# Patient Record
Sex: FEMALE | Race: WHITE | URBAN - METROPOLITAN AREA
[De-identification: names, ages, dates, MRNs, and addresses within clinical notes are randomized per-mention and may not be internally consistent; named-entity substitution may affect disease eponyms.]

---

## 2017-12-21 PROCEDURE — 86762 RUBELLA ANTIBODY: CPT | Performed by: OBSTETRICS & GYNECOLOGY

## 2017-12-21 PROCEDURE — 87491 CHLMYD TRACH DNA AMP PROBE: CPT | Performed by: OBSTETRICS & GYNECOLOGY

## 2017-12-21 PROCEDURE — 86850 RBC ANTIBODY SCREEN: CPT | Performed by: OBSTETRICS & GYNECOLOGY

## 2017-12-21 PROCEDURE — 87591 N.GONORRHOEAE DNA AMP PROB: CPT | Performed by: OBSTETRICS & GYNECOLOGY

## 2017-12-21 PROCEDURE — 87624 HPV HI-RISK TYP POOLED RSLT: CPT | Performed by: OBSTETRICS & GYNECOLOGY

## 2017-12-21 PROCEDURE — 86780 TREPONEMA PALLIDUM: CPT | Performed by: OBSTETRICS & GYNECOLOGY

## 2017-12-21 PROCEDURE — 86901 BLOOD TYPING SEROLOGIC RH(D): CPT | Performed by: OBSTETRICS & GYNECOLOGY

## 2017-12-21 PROCEDURE — 87389 HIV-1 AG W/HIV-1&-2 AB AG IA: CPT | Performed by: OBSTETRICS & GYNECOLOGY

## 2017-12-21 PROCEDURE — 88175 CYTOPATH C/V AUTO FLUID REDO: CPT | Performed by: OBSTETRICS & GYNECOLOGY

## 2017-12-21 PROCEDURE — 87340 HEPATITIS B SURFACE AG IA: CPT | Performed by: OBSTETRICS & GYNECOLOGY

## 2017-12-21 PROCEDURE — 87086 URINE CULTURE/COLONY COUNT: CPT | Performed by: OBSTETRICS & GYNECOLOGY

## 2017-12-21 PROCEDURE — 86900 BLOOD TYPING SEROLOGIC ABO: CPT | Performed by: OBSTETRICS & GYNECOLOGY

## 2018-05-01 PROCEDURE — 86780 TREPONEMA PALLIDUM: CPT | Performed by: OBSTETRICS & GYNECOLOGY

## 2018-05-01 PROCEDURE — 87086 URINE CULTURE/COLONY COUNT: CPT | Performed by: OBSTETRICS & GYNECOLOGY

## 2018-05-01 PROCEDURE — 82950 GLUCOSE TEST: CPT | Performed by: OBSTETRICS & GYNECOLOGY

## 2018-05-30 PROCEDURE — 36415 COLL VENOUS BLD VENIPUNCTURE: CPT | Performed by: OBSTETRICS & GYNECOLOGY

## 2018-05-30 PROCEDURE — 82951 GLUCOSE TOLERANCE TEST (GTT): CPT | Performed by: OBSTETRICS & GYNECOLOGY

## 2018-05-30 PROCEDURE — 82952 GTT-ADDED SAMPLES: CPT | Performed by: OBSTETRICS & GYNECOLOGY

## 2018-06-25 LAB — AMB EXT STREP B CULTURE: NEGATIVE

## 2018-07-30 ENCOUNTER — LAB ENCOUNTER (OUTPATIENT)
Dept: LAB | Facility: HOSPITAL | Age: 38
End: 2018-07-30
Attending: OBSTETRICS & GYNECOLOGY
Payer: COMMERCIAL

## 2018-07-30 DIAGNOSIS — Z01.818 PRE-OP TESTING: ICD-10-CM

## 2018-07-30 LAB
ANTIBODY SCREEN: NEGATIVE
BASOPHILS # BLD: 0 K/UL (ref 0–0.2)
BASOPHILS NFR BLD: 0 %
EOSINOPHIL # BLD: 0.1 K/UL (ref 0–0.7)
EOSINOPHIL NFR BLD: 1 %
ERYTHROCYTE [DISTWIDTH] IN BLOOD BY AUTOMATED COUNT: 13.4 % (ref 11–15)
HCT VFR BLD AUTO: 40.4 % (ref 35–48)
HGB BLD-MCNC: 14.2 G/DL (ref 12–16)
LYMPHOCYTES # BLD: 1.2 K/UL (ref 1–4)
LYMPHOCYTES NFR BLD: 12 %
MCH RBC QN AUTO: 31.2 PG (ref 27–32)
MCHC RBC AUTO-ENTMCNC: 35.3 G/DL (ref 32–37)
MCV RBC AUTO: 88.5 FL (ref 80–100)
MONOCYTES # BLD: 0.7 K/UL (ref 0–1)
MONOCYTES NFR BLD: 7 %
NEUTROPHILS # BLD AUTO: 8.2 K/UL (ref 1.8–7.7)
NEUTROPHILS NFR BLD: 81 %
PLATELET # BLD AUTO: 160 K/UL (ref 140–400)
PMV BLD AUTO: 8.9 FL (ref 7.4–10.3)
RBC # BLD AUTO: 4.56 M/UL (ref 3.7–5.4)
RH BLOOD TYPE: POSITIVE
WBC # BLD AUTO: 10.2 K/UL (ref 4–11)

## 2018-07-30 PROCEDURE — 36415 COLL VENOUS BLD VENIPUNCTURE: CPT

## 2018-07-30 PROCEDURE — 85025 COMPLETE CBC W/AUTO DIFF WBC: CPT

## 2018-07-30 PROCEDURE — 86901 BLOOD TYPING SEROLOGIC RH(D): CPT

## 2018-07-30 PROCEDURE — 86900 BLOOD TYPING SEROLOGIC ABO: CPT

## 2018-07-30 PROCEDURE — 86850 RBC ANTIBODY SCREEN: CPT

## 2018-07-31 ENCOUNTER — ANESTHESIA EVENT (OUTPATIENT)
Dept: OBGYN UNIT | Facility: HOSPITAL | Age: 38
End: 2018-07-31
Payer: COMMERCIAL

## 2018-08-01 ENCOUNTER — ANESTHESIA (OUTPATIENT)
Dept: OBGYN UNIT | Facility: HOSPITAL | Age: 38
End: 2018-08-01
Payer: COMMERCIAL

## 2018-08-01 ENCOUNTER — SURGERY (OUTPATIENT)
Age: 38
End: 2018-08-01

## 2018-08-01 ENCOUNTER — HOSPITAL ENCOUNTER (INPATIENT)
Facility: HOSPITAL | Age: 38
LOS: 3 days | Discharge: HOME | End: 2018-08-04
Attending: OBSTETRICS & GYNECOLOGY | Admitting: OBSTETRICS & GYNECOLOGY
Payer: COMMERCIAL

## 2018-08-01 PROBLEM — O34.219 PREVIOUS CESAREAN DELIVERY AFFECTING PREGNANCY: Status: ACTIVE | Noted: 2018-08-01

## 2018-08-01 PROBLEM — Z98.891 S/P C-SECTION: Status: ACTIVE | Noted: 2018-08-01

## 2018-08-01 LAB
GLUCOSE BLDC GLUCOMTR-MCNC: 104 MG/DL (ref 70–99)
HIV1+2 AB SPEC QL IA.RAPID: NONREACTIVE

## 2018-08-01 PROCEDURE — 88307 TISSUE EXAM BY PATHOLOGIST: CPT | Performed by: OBSTETRICS & GYNECOLOGY

## 2018-08-01 PROCEDURE — 82962 GLUCOSE BLOOD TEST: CPT

## 2018-08-01 PROCEDURE — 86701 HIV-1ANTIBODY: CPT | Performed by: OBSTETRICS & GYNECOLOGY

## 2018-08-01 PROCEDURE — S0028 INJECTION, FAMOTIDINE, 20 MG: HCPCS

## 2018-08-01 PROCEDURE — 36415 COLL VENOUS BLD VENIPUNCTURE: CPT

## 2018-08-01 PROCEDURE — 51702 INSERT TEMP BLADDER CATH: CPT

## 2018-08-01 RX ORDER — SODIUM PHOSPHATE, DIBASIC AND SODIUM PHOSPHATE, MONOBASIC 7; 19 G/133ML; G/133ML
1 ENEMA RECTAL ONCE AS NEEDED
Status: DISCONTINUED | OUTPATIENT
Start: 2018-08-01 | End: 2018-08-04

## 2018-08-01 RX ORDER — HYDROCODONE BITARTRATE AND ACETAMINOPHEN 5; 325 MG/1; MG/1
2 TABLET ORAL EVERY 6 HOURS PRN
Status: DISCONTINUED | OUTPATIENT
Start: 2018-08-01 | End: 2018-08-04

## 2018-08-01 RX ORDER — KETOROLAC TROMETHAMINE 30 MG/ML
INJECTION, SOLUTION INTRAMUSCULAR; INTRAVENOUS AS NEEDED
Status: DISCONTINUED | OUTPATIENT
Start: 2018-08-01 | End: 2018-08-01 | Stop reason: SURG

## 2018-08-01 RX ORDER — BUPIVACAINE HYDROCHLORIDE 7.5 MG/ML
INJECTION, SOLUTION INTRASPINAL AS NEEDED
Status: DISCONTINUED | OUTPATIENT
Start: 2018-08-01 | End: 2018-08-01 | Stop reason: SURG

## 2018-08-01 RX ORDER — SODIUM CHLORIDE 0.9 % (FLUSH) 0.9 %
10 SYRINGE (ML) INJECTION AS NEEDED
Status: DISCONTINUED | OUTPATIENT
Start: 2018-08-01 | End: 2018-08-04

## 2018-08-01 RX ORDER — MORPHINE SULFATE 1 MG/ML
INJECTION, SOLUTION EPIDURAL; INTRATHECAL; INTRAVENOUS AS NEEDED
Status: DISCONTINUED | OUTPATIENT
Start: 2018-08-01 | End: 2018-08-01 | Stop reason: SURG

## 2018-08-01 RX ORDER — VITAMIN C, CALCIUM, IRON, VITAMIN D3, VITAMIN E, THIAMIN, RIBOFLAVIN, NIACINAMIDE, VITAMIN B6, FOLIC ACID, IODINE, ZINC, COPPER, DOCUSATE SODIUM
1 KIT DAILY
Status: DISCONTINUED | OUTPATIENT
Start: 2018-08-01 | End: 2018-08-01

## 2018-08-01 RX ORDER — PRENATAL VIT,CAL 76/IRON/FOLIC 29 MG-1 MG
1 TABLET ORAL DAILY
Status: DISCONTINUED | OUTPATIENT
Start: 2018-08-01 | End: 2018-08-04

## 2018-08-01 RX ORDER — SODIUM CHLORIDE, SODIUM LACTATE, POTASSIUM CHLORIDE, CALCIUM CHLORIDE 600; 310; 30; 20 MG/100ML; MG/100ML; MG/100ML; MG/100ML
INJECTION, SOLUTION INTRAVENOUS
Status: COMPLETED
Start: 2018-08-01 | End: 2018-08-01

## 2018-08-01 RX ORDER — NALOXONE HYDROCHLORIDE 0.4 MG/ML
0.08 INJECTION, SOLUTION INTRAMUSCULAR; INTRAVENOUS; SUBCUTANEOUS
Status: ACTIVE | OUTPATIENT
Start: 2018-08-01 | End: 2018-08-02

## 2018-08-01 RX ORDER — ONDANSETRON 2 MG/ML
4 INJECTION INTRAMUSCULAR; INTRAVENOUS ONCE AS NEEDED
Status: ACTIVE | OUTPATIENT
Start: 2018-08-01 | End: 2018-08-01

## 2018-08-01 RX ORDER — SIMETHICONE 80 MG
80 TABLET,CHEWABLE ORAL 3 TIMES DAILY PRN
Status: DISCONTINUED | OUTPATIENT
Start: 2018-08-01 | End: 2018-08-04

## 2018-08-01 RX ORDER — DEXTROSE, SODIUM CHLORIDE, SODIUM LACTATE, POTASSIUM CHLORIDE, AND CALCIUM CHLORIDE 5; .6; .31; .03; .02 G/100ML; G/100ML; G/100ML; G/100ML; G/100ML
INJECTION, SOLUTION INTRAVENOUS CONTINUOUS
Status: DISCONTINUED | OUTPATIENT
Start: 2018-08-01 | End: 2018-08-04

## 2018-08-01 RX ORDER — SODIUM CHLORIDE, SODIUM LACTATE, POTASSIUM CHLORIDE, CALCIUM CHLORIDE 600; 310; 30; 20 MG/100ML; MG/100ML; MG/100ML; MG/100ML
INJECTION, SOLUTION INTRAVENOUS CONTINUOUS
Status: DISCONTINUED | OUTPATIENT
Start: 2018-08-01 | End: 2018-08-01

## 2018-08-01 RX ORDER — HALOPERIDOL 5 MG/ML
0.5 INJECTION INTRAMUSCULAR ONCE AS NEEDED
Status: ACTIVE | OUTPATIENT
Start: 2018-08-01 | End: 2018-08-01

## 2018-08-01 RX ORDER — ACETAMINOPHEN 325 MG/1
650 TABLET ORAL EVERY 6 HOURS PRN
Status: DISCONTINUED | OUTPATIENT
Start: 2018-08-01 | End: 2018-08-04

## 2018-08-01 RX ORDER — DIPHENHYDRAMINE HYDROCHLORIDE 50 MG/ML
12.5 INJECTION INTRAMUSCULAR; INTRAVENOUS EVERY 4 HOURS PRN
Status: ACTIVE | OUTPATIENT
Start: 2018-08-01 | End: 2018-08-02

## 2018-08-01 RX ORDER — NALBUPHINE HCL 10 MG/ML
2.5 AMPUL (ML) INJECTION EVERY 4 HOURS PRN
Status: ACTIVE | OUTPATIENT
Start: 2018-08-01 | End: 2018-08-02

## 2018-08-01 RX ORDER — IBUPROFEN 600 MG/1
600 TABLET ORAL EVERY 6 HOURS PRN
Status: DISCONTINUED | OUTPATIENT
Start: 2018-08-02 | End: 2018-08-04

## 2018-08-01 RX ORDER — DIPHENHYDRAMINE HCL 25 MG
25 CAPSULE ORAL EVERY 4 HOURS PRN
Status: ACTIVE | OUTPATIENT
Start: 2018-08-01 | End: 2018-08-02

## 2018-08-01 RX ORDER — TRISODIUM CITRATE DIHYDRATE AND CITRIC ACID MONOHYDRATE 500; 334 MG/5ML; MG/5ML
30 SOLUTION ORAL ONCE
Status: COMPLETED | OUTPATIENT
Start: 2018-08-01 | End: 2018-08-01

## 2018-08-01 RX ORDER — ACETAMINOPHEN 325 MG/1
650 TABLET ORAL EVERY 6 HOURS PRN
Status: ACTIVE | OUTPATIENT
Start: 2018-08-01 | End: 2018-08-02

## 2018-08-01 RX ORDER — HYDROCODONE BITARTRATE AND ACETAMINOPHEN 7.5; 325 MG/1; MG/1
1 TABLET ORAL EVERY 6 HOURS PRN
Status: DISPENSED | OUTPATIENT
Start: 2018-08-01 | End: 2018-08-02

## 2018-08-01 RX ORDER — DEXAMETHASONE SODIUM PHOSPHATE 4 MG/ML
VIAL (ML) INJECTION AS NEEDED
Status: DISCONTINUED | OUTPATIENT
Start: 2018-08-01 | End: 2018-08-01 | Stop reason: SURG

## 2018-08-01 RX ORDER — CEFAZOLIN SODIUM/WATER 2 G/20 ML
2 SYRINGE (ML) INTRAVENOUS
Status: COMPLETED | OUTPATIENT
Start: 2018-08-01 | End: 2018-08-01

## 2018-08-01 RX ORDER — KETOROLAC TROMETHAMINE 30 MG/ML
30 INJECTION, SOLUTION INTRAMUSCULAR; INTRAVENOUS EVERY 6 HOURS PRN
Status: DISPENSED | OUTPATIENT
Start: 2018-08-01 | End: 2018-08-03

## 2018-08-01 RX ORDER — AMMONIA INHALANTS 0.04 G/.3ML
0.3 INHALANT RESPIRATORY (INHALATION) AS NEEDED
Status: DISCONTINUED | OUTPATIENT
Start: 2018-08-01 | End: 2018-08-04

## 2018-08-01 RX ORDER — FAMOTIDINE 10 MG/ML
INJECTION, SOLUTION INTRAVENOUS
Status: COMPLETED
Start: 2018-08-01 | End: 2018-08-01

## 2018-08-01 RX ORDER — SODIUM CHLORIDE 0.9 % (FLUSH) 0.9 %
10 SYRINGE (ML) INJECTION AS NEEDED
Status: DISCONTINUED | OUTPATIENT
Start: 2018-08-01 | End: 2018-08-01

## 2018-08-01 RX ORDER — PHENYLEPHRINE HCL 10 MG/ML
VIAL (ML) INJECTION AS NEEDED
Status: DISCONTINUED | OUTPATIENT
Start: 2018-08-01 | End: 2018-08-01 | Stop reason: SURG

## 2018-08-01 RX ORDER — POLYETHYLENE GLYCOL 3350 17 G/17G
17 POWDER, FOR SOLUTION ORAL DAILY PRN
Status: DISCONTINUED | OUTPATIENT
Start: 2018-08-01 | End: 2018-08-04

## 2018-08-01 RX ORDER — ONDANSETRON 2 MG/ML
INJECTION INTRAMUSCULAR; INTRAVENOUS AS NEEDED
Status: DISCONTINUED | OUTPATIENT
Start: 2018-08-01 | End: 2018-08-01 | Stop reason: SURG

## 2018-08-01 RX ORDER — DEXTROSE, SODIUM CHLORIDE, SODIUM LACTATE, POTASSIUM CHLORIDE, AND CALCIUM CHLORIDE 5; .6; .31; .03; .02 G/100ML; G/100ML; G/100ML; G/100ML; G/100ML
INJECTION, SOLUTION INTRAVENOUS
Status: COMPLETED
Start: 2018-08-01 | End: 2018-08-01

## 2018-08-01 RX ORDER — BISACODYL 10 MG
10 SUPPOSITORY, RECTAL RECTAL
Status: DISCONTINUED | OUTPATIENT
Start: 2018-08-01 | End: 2018-08-04

## 2018-08-01 RX ORDER — METOCLOPRAMIDE HYDROCHLORIDE 5 MG/ML
INJECTION INTRAMUSCULAR; INTRAVENOUS
Status: COMPLETED
Start: 2018-08-01 | End: 2018-08-01

## 2018-08-01 RX ORDER — HYDROCODONE BITARTRATE AND ACETAMINOPHEN 5; 325 MG/1; MG/1
1 TABLET ORAL EVERY 6 HOURS PRN
Status: DISCONTINUED | OUTPATIENT
Start: 2018-08-01 | End: 2018-08-04

## 2018-08-01 RX ORDER — DOCUSATE SODIUM 100 MG/1
100 CAPSULE, LIQUID FILLED ORAL
Status: DISCONTINUED | OUTPATIENT
Start: 2018-08-01 | End: 2018-08-04

## 2018-08-01 RX ORDER — HYDROCODONE BITARTRATE AND ACETAMINOPHEN 7.5; 325 MG/1; MG/1
2 TABLET ORAL EVERY 6 HOURS PRN
Status: ACTIVE | OUTPATIENT
Start: 2018-08-01 | End: 2018-08-02

## 2018-08-01 RX ORDER — ACETAMINOPHEN 650 MG/1
650 SUPPOSITORY RECTAL EVERY 6 HOURS PRN
Status: DISCONTINUED | OUTPATIENT
Start: 2018-08-01 | End: 2018-08-01

## 2018-08-01 RX ORDER — ONDANSETRON 2 MG/ML
4 INJECTION INTRAMUSCULAR; INTRAVENOUS EVERY 6 HOURS PRN
Status: DISCONTINUED | OUTPATIENT
Start: 2018-08-01 | End: 2018-08-04

## 2018-08-01 RX ADMIN — KETOROLAC TROMETHAMINE 30 MG: 30 INJECTION, SOLUTION INTRAMUSCULAR; INTRAVENOUS at 08:38:00

## 2018-08-01 RX ADMIN — CEFAZOLIN SODIUM/WATER 2 G: 2 G/20 ML SYRINGE (ML) INTRAVENOUS at 08:10:00

## 2018-08-01 RX ADMIN — ONDANSETRON 4 MG: 2 INJECTION INTRAMUSCULAR; INTRAVENOUS at 08:23:00

## 2018-08-01 RX ADMIN — SODIUM CHLORIDE, SODIUM LACTATE, POTASSIUM CHLORIDE, CALCIUM CHLORIDE: 600; 310; 30; 20 INJECTION, SOLUTION INTRAVENOUS at 07:50:00

## 2018-08-01 RX ADMIN — PHENYLEPHRINE HCL 200 MCG: 10 MG/ML VIAL (ML) INJECTION at 08:10:00

## 2018-08-01 RX ADMIN — BUPIVACAINE HYDROCHLORIDE 1.6 ML: 7.5 INJECTION, SOLUTION INTRASPINAL at 08:02:00

## 2018-08-01 RX ADMIN — MORPHINE SULFATE 0.2 MG: 1 INJECTION, SOLUTION EPIDURAL; INTRATHECAL; INTRAVENOUS at 08:02:00

## 2018-08-01 RX ADMIN — DEXAMETHASONE SODIUM PHOSPHATE 4 MG: 4 MG/ML VIAL (ML) INJECTION at 08:23:00

## 2018-08-01 NOTE — H&P
OB H&P    Tia Part is a 30year old female. Patient's last menstrual period was 10/22/2017. HPI:    Patient for repeat  at term.    Rh positive GBS negative.     Allergies:  No Known Allergies   Current Meds:     Current Outpatient Prescr Soft, Gravid reactive tracing. EXTREMITIES:  non tender without edema   NEUROLOGIC: intact  PSYCHIATRIC: alert and oriented x 3; affect appropriate        ASSESSMENT/ PLAN:   Previous LTCS      For repeat .   Discussed risks and benefits of surgery

## 2018-08-01 NOTE — ANESTHESIA POSTPROCEDURE EVALUATION
Patient: Bryant Cords    Procedure Summary     Date:  18 Room / Location:  [address] [place] [address] OR  [facility] OR    Anesthesia Start:   Anesthesia Stop:      Procedure:   SECTION (N/A ) Diagnosis:      Surgeon:  Alena Oquendo MD Anesthesio

## 2018-08-01 NOTE — ANESTHESIA PREPROCEDURE EVALUATION
Anesthesia PreOp Note    HPI:     Franco Gomes is a 30year old female who presents for preoperative consultation requested by: Leela Ellis MD    Date of Surgery: 2018    Procedure(s):   SECTION  Indication: * No pre-op diagnosis entere Maternal Grandmother      bone CA       Social History  Social History   Marital status: Single  Spouse name: N/A    Years of education: N/A  Number of children: N/A     Occupational History  None on file     Social History Main Topics   Smoking status: Ne the anesthetic plan, benefits, risks including possible dental damage if relevant, major complications, and any alternative forms of anesthetic management. All of the patient's questions were answered to the best of my ability.  The patient desires the an

## 2018-08-01 NOTE — ANESTHESIA PROCEDURE NOTES
Spinal Block  Performed by: Sigrid Anne by: Christiana Persaud     Patient Location:  OB  Start Time:  8/1/2018 8:10 AM  End Time:  8/1/2018 8:10 AM  Site identification: surface landmarks    Reason for Block: surgical anesthesia    Anesthesiolo

## 2018-08-01 NOTE — LACTATION NOTE
LACTATION NOTE - MOTHER      Evaluation Type: Inpatient    Problems identified  Problems identified: Milk supply WNL; Knowledge deficit    Maternal history  Maternal history:  section;AMA; Infertility  Other/comment: IVF with first    Breastfeeding g

## 2018-08-01 NOTE — OPERATIVE REPORT
[facility] - [facility]     Section Delivery / Operative Note    Franco Gomes Patient Status:  Inpatient    10/28/1980 MRN [id number]   Location [address]  Attending Leela Ellis MD   Hosp Day # 0 PCP No prim was entered with blunt dissection superiorly and extended inferiorly, with good visualization of bladder at all times. A bladder blade was inserted, bladder flap created and bladder blade replaced. The uterus was scored in the midline. clear encountered.

## 2018-08-02 LAB
BASOPHILS # BLD: 0 K/UL (ref 0–0.2)
BASOPHILS NFR BLD: 0 %
EOSINOPHIL # BLD: 0.1 K/UL (ref 0–0.7)
EOSINOPHIL NFR BLD: 1 %
ERYTHROCYTE [DISTWIDTH] IN BLOOD BY AUTOMATED COUNT: 13.7 % (ref 11–15)
HCT VFR BLD AUTO: 36.4 % (ref 35–48)
HGB BLD-MCNC: 12.3 G/DL (ref 12–16)
LYMPHOCYTES # BLD: 1.4 K/UL (ref 1–4)
LYMPHOCYTES NFR BLD: 15 %
MCH RBC QN AUTO: 30.9 PG (ref 27–32)
MCHC RBC AUTO-ENTMCNC: 33.7 G/DL (ref 32–37)
MCV RBC AUTO: 91.7 FL (ref 80–100)
MONOCYTES # BLD: 0.7 K/UL (ref 0–1)
MONOCYTES NFR BLD: 7 %
NEUTROPHILS # BLD AUTO: 7.7 K/UL (ref 1.8–7.7)
NEUTROPHILS NFR BLD: 77 %
PLATELET # BLD AUTO: 132 K/UL (ref 140–400)
PMV BLD AUTO: 8 FL (ref 7.4–10.3)
RBC # BLD AUTO: 3.97 M/UL (ref 3.7–5.4)
WBC # BLD AUTO: 9.9 K/UL (ref 4–11)

## 2018-08-02 PROCEDURE — 85025 COMPLETE CBC W/AUTO DIFF WBC: CPT | Performed by: OBSTETRICS & GYNECOLOGY

## 2018-08-02 PROCEDURE — A4216 STERILE WATER/SALINE, 10 ML: HCPCS | Performed by: OBSTETRICS & GYNECOLOGY

## 2018-08-02 NOTE — ANESTHESIA POST-OP FOLLOW-UP NOTE
S/P c/s ,intrathecal Duramorph,POD # 1,doing well  No HA no BA no new neurologic signs or symptoms  Site is clean dry intact   D/c from service

## 2018-08-02 NOTE — PROGRESS NOTES
[place] [facility]    OB/GYNE Progress Note      Tana Oviedo Patient Status:  Inpatient    10/28/1980 MRN [id number]   Location [facility] 3SE Attending Mario Reza MD   Hosp Day # 1 PCP No primary care provider on file.        As

## 2018-08-02 NOTE — LACTATION NOTE
This note was copied from a baby's chart.   LACTATION NOTE - INFANT    Evaluation Type  Evaluation Type: Inpatient    Problems & Assessment  Muscle tone: Appropriate for GA    Feeding Assessment  Summary Current Feeding: Adlib;Breastfeeding exclusively  Chanelle

## 2018-08-02 NOTE — LACTATION NOTE
LACTATION NOTE - MOTHER      Evaluation Type: Inpatient    Problems identified  Problems identified: Knowledge deficit;Milk supply WNL    Maternal history  Maternal history: AMA;  section; Infertility  Other/comment: IVF with first    Breastfeeding g

## 2018-08-03 PROCEDURE — A4216 STERILE WATER/SALINE, 10 ML: HCPCS | Performed by: OBSTETRICS & GYNECOLOGY

## 2018-08-03 RX ORDER — IBUPROFEN 600 MG/1
600 TABLET ORAL EVERY 6 HOURS PRN
Qty: 60 TABLET | Refills: 0 | Status: SHIPPED | OUTPATIENT
Start: 2018-08-03 | End: 2020-03-12

## 2018-08-03 RX ORDER — HYDROCODONE BITARTRATE AND ACETAMINOPHEN 5; 325 MG/1; MG/1
1 TABLET ORAL EVERY 6 HOURS PRN
Qty: 30 TABLET | Refills: 0 | Status: SHIPPED | OUTPATIENT
Start: 2018-08-03 | End: 2020-03-12

## 2018-08-03 RX ORDER — IBUPROFEN 600 MG/1
600 TABLET ORAL EVERY 6 HOURS PRN
Status: DISCONTINUED | OUTPATIENT
Start: 2018-08-03 | End: 2018-08-04

## 2018-08-03 NOTE — PROGRESS NOTES
[place] [facility] - [facility]    OB/GYNE Progress Note      Radha Oliva Patient Status:  Inpatient    10/28/1980 MRN [id number]   Location [facility] 3SE Attending Megan Dumont MD   Hosp Day # 2 PCP No primary care provider on file.        As

## 2018-08-03 NOTE — PLAN OF CARE
ANXIETY    • Will report anxiety at manageable levels Progressing        PAIN - ADULT    • Verbalizes/displays adequate comfort level or patient's stated pain goal Progressing        Patient Centered Care    • Patient preferences are identified and Gabe Elder

## 2018-08-03 NOTE — PLAN OF CARE
ANXIETY    • Will report anxiety at manageable levels Progressing        PAIN - ADULT    • Verbalizes/displays adequate comfort level or patient's stated pain goal Progressing        Patient Centered Care    • Patient preferences are identified and Christain Fresh

## 2018-08-04 VITALS
OXYGEN SATURATION: 98 % | SYSTOLIC BLOOD PRESSURE: 99 MMHG | DIASTOLIC BLOOD PRESSURE: 46 MMHG | HEART RATE: 66 BPM | HEIGHT: 67.01 IN | TEMPERATURE: 98 F | WEIGHT: 177 LBS | BODY MASS INDEX: 27.78 KG/M2 | RESPIRATION RATE: 16 BRPM

## 2018-08-04 NOTE — DISCHARGE SUMMARY
[facility]    Discharge Summary    Iman Young Patient Status:  Inpatient    10/28/1980 MRN [id number]   Location [facility] 3SE Attending Darren Simons MD   Hosp Day # 3 PCP No primary care provider on file.      Date of

## 2018-08-04 NOTE — PROGRESS NOTES
[place] [facility] - [facility]    OB/GYNE Progress Note      Tana Shock Patient Status:  Inpatient    10/28/1980 MRN [id number]   Location [facility] 3SE Attending Bernadette Rankin MD   Hosp Day # 3 PCP No primary care provider on file.        As

## 2022-03-09 PROBLEM — O09.522 AMA (ADVANCED MATERNAL AGE) MULTIGRAVIDA 35+, SECOND TRIMESTER: Status: ACTIVE | Noted: 2022-03-09

## 2022-05-07 LAB
AMB EXT TREPONEMAL ANTIBODIES: NEGATIVE
AMB EXT TREPONEMAL ANTIBODIES: NEGATIVE
HIV RESULT OB: NEGATIVE
HIV RESULT OB: NEGATIVE

## 2022-07-23 ENCOUNTER — LAB ENCOUNTER (OUTPATIENT)
Dept: LAB | Facility: HOSPITAL | Age: 42
End: 2022-07-23
Attending: OBSTETRICS & GYNECOLOGY
Payer: COMMERCIAL

## 2022-07-23 DIAGNOSIS — Z01.818 PRE-OP TESTING: ICD-10-CM

## 2022-07-23 DIAGNOSIS — Z01.818 PRE-OPERATIVE CLEARANCE: Primary | ICD-10-CM

## 2022-07-23 LAB
ANTIBODY SCREEN: NEGATIVE
BASOPHILS # BLD AUTO: 0.03 X10(3) UL (ref 0–0.2)
BASOPHILS NFR BLD AUTO: 0.4 %
DEPRECATED RDW RBC AUTO: 44.3 FL (ref 35.1–46.3)
EOSINOPHIL # BLD AUTO: 0.08 X10(3) UL (ref 0–0.7)
EOSINOPHIL NFR BLD AUTO: 1 %
ERYTHROCYTE [DISTWIDTH] IN BLOOD BY AUTOMATED COUNT: 15.7 % (ref 11–15)
HCT VFR BLD AUTO: 32.7 %
HGB BLD-MCNC: 9.9 G/DL
IMM GRANULOCYTES # BLD AUTO: 0.1 X10(3) UL (ref 0–1)
IMM GRANULOCYTES NFR BLD: 1.2 %
LYMPHOCYTES # BLD AUTO: 1.26 X10(3) UL (ref 1–4)
LYMPHOCYTES NFR BLD AUTO: 15.5 %
MCH RBC QN AUTO: 23.7 PG (ref 26–34)
MCHC RBC AUTO-ENTMCNC: 30.3 G/DL (ref 31–37)
MCV RBC AUTO: 78.4 FL
MONOCYTES # BLD AUTO: 0.65 X10(3) UL (ref 0.1–1)
MONOCYTES NFR BLD AUTO: 8 %
NEUTROPHILS # BLD AUTO: 6.01 X10 (3) UL (ref 1.5–7.7)
NEUTROPHILS # BLD AUTO: 6.01 X10(3) UL (ref 1.5–7.7)
NEUTROPHILS NFR BLD AUTO: 73.9 %
PLATELET # BLD AUTO: 192 10(3)UL (ref 150–450)
RBC # BLD AUTO: 4.17 X10(6)UL
RH BLOOD TYPE: POSITIVE
WBC # BLD AUTO: 8.1 X10(3) UL (ref 4–11)

## 2022-07-23 PROCEDURE — 86850 RBC ANTIBODY SCREEN: CPT

## 2022-07-23 PROCEDURE — 85025 COMPLETE CBC W/AUTO DIFF WBC: CPT

## 2022-07-23 PROCEDURE — 86901 BLOOD TYPING SEROLOGIC RH(D): CPT

## 2022-07-23 PROCEDURE — 36415 COLL VENOUS BLD VENIPUNCTURE: CPT

## 2022-07-23 PROCEDURE — 86900 BLOOD TYPING SEROLOGIC ABO: CPT

## 2022-07-24 LAB — SARS-COV-2 RNA RESP QL NAA+PROBE: NOT DETECTED

## 2022-07-26 ENCOUNTER — ANESTHESIA (OUTPATIENT)
Dept: OBGYN UNIT | Facility: HOSPITAL | Age: 42
End: 2022-07-26
Payer: COMMERCIAL

## 2022-07-26 ENCOUNTER — ANESTHESIA EVENT (OUTPATIENT)
Dept: OBGYN UNIT | Facility: HOSPITAL | Age: 42
End: 2022-07-26
Payer: COMMERCIAL

## 2022-07-26 ENCOUNTER — HOSPITAL ENCOUNTER (INPATIENT)
Facility: HOSPITAL | Age: 42
LOS: 4 days | Discharge: HOME | End: 2022-07-30
Attending: OBSTETRICS & GYNECOLOGY | Admitting: OBSTETRICS & GYNECOLOGY
Payer: COMMERCIAL

## 2022-07-26 PROBLEM — Z34.90 PREGNANCY: Status: ACTIVE | Noted: 2022-07-26

## 2022-07-26 PROCEDURE — S0028 INJECTION, FAMOTIDINE, 20 MG: HCPCS

## 2022-07-26 PROCEDURE — 88307 TISSUE EXAM BY PATHOLOGIST: CPT | Performed by: OBSTETRICS & GYNECOLOGY

## 2022-07-26 RX ORDER — NALOXONE HYDROCHLORIDE 0.4 MG/ML
80 INJECTION, SOLUTION INTRAMUSCULAR; INTRAVENOUS; SUBCUTANEOUS AS NEEDED
OUTPATIENT
Start: 2022-07-26 | End: 2022-07-26

## 2022-07-26 RX ORDER — ACETAMINOPHEN 500 MG
TABLET ORAL
Status: COMPLETED
Start: 2022-07-26 | End: 2022-07-26

## 2022-07-26 RX ORDER — DEXTROSE, SODIUM CHLORIDE, SODIUM LACTATE, POTASSIUM CHLORIDE, AND CALCIUM CHLORIDE 5; .6; .31; .03; .02 G/100ML; G/100ML; G/100ML; G/100ML; G/100ML
INJECTION, SOLUTION INTRAVENOUS CONTINUOUS
Status: DISCONTINUED | OUTPATIENT
Start: 2022-07-26 | End: 2022-07-30

## 2022-07-26 RX ORDER — KETOROLAC TROMETHAMINE 30 MG/ML
INJECTION, SOLUTION INTRAMUSCULAR; INTRAVENOUS AS NEEDED
Status: DISCONTINUED | OUTPATIENT
Start: 2022-07-26 | End: 2022-07-26 | Stop reason: SURG

## 2022-07-26 RX ORDER — EPHEDRINE SULFATE 50 MG/ML
INJECTION INTRAVENOUS AS NEEDED
Status: DISCONTINUED | OUTPATIENT
Start: 2022-07-26 | End: 2022-07-26 | Stop reason: SURG

## 2022-07-26 RX ORDER — BISACODYL 10 MG
10 SUPPOSITORY, RECTAL RECTAL ONCE AS NEEDED
Status: DISCONTINUED | OUTPATIENT
Start: 2022-07-26 | End: 2022-07-30

## 2022-07-26 RX ORDER — MORPHINE SULFATE 4 MG/ML
4 INJECTION, SOLUTION INTRAMUSCULAR; INTRAVENOUS EVERY 10 MIN PRN
OUTPATIENT
Start: 2022-07-26

## 2022-07-26 RX ORDER — SODIUM CHLORIDE, SODIUM LACTATE, POTASSIUM CHLORIDE, CALCIUM CHLORIDE 600; 310; 30; 20 MG/100ML; MG/100ML; MG/100ML; MG/100ML
INJECTION, SOLUTION INTRAVENOUS CONTINUOUS
OUTPATIENT
Start: 2022-07-26

## 2022-07-26 RX ORDER — LIDOCAINE HYDROCHLORIDE 10 MG/ML
INJECTION, SOLUTION INFILTRATION; PERINEURAL
Status: COMPLETED | OUTPATIENT
Start: 2022-07-26 | End: 2022-07-26

## 2022-07-26 RX ORDER — ONDANSETRON 2 MG/ML
4 INJECTION INTRAMUSCULAR; INTRAVENOUS EVERY 6 HOURS PRN
Status: DISCONTINUED | OUTPATIENT
Start: 2022-07-26 | End: 2022-07-26 | Stop reason: HOSPADM

## 2022-07-26 RX ORDER — ACETAMINOPHEN 500 MG
1000 TABLET ORAL EVERY 6 HOURS
Status: DISCONTINUED | OUTPATIENT
Start: 2022-07-26 | End: 2022-07-28

## 2022-07-26 RX ORDER — FAMOTIDINE 10 MG/ML
20 INJECTION, SOLUTION INTRAVENOUS ONCE
Status: COMPLETED | OUTPATIENT
Start: 2022-07-26 | End: 2022-07-26

## 2022-07-26 RX ORDER — HYDROMORPHONE HYDROCHLORIDE 2 MG/1
2 TABLET ORAL EVERY 4 HOURS PRN
Status: DISCONTINUED | OUTPATIENT
Start: 2022-07-26 | End: 2022-07-30

## 2022-07-26 RX ORDER — HYDROMORPHONE HYDROCHLORIDE 1 MG/ML
0.4 INJECTION, SOLUTION INTRAMUSCULAR; INTRAVENOUS; SUBCUTANEOUS EVERY 5 MIN PRN
OUTPATIENT
Start: 2022-07-26

## 2022-07-26 RX ORDER — KETOROLAC TROMETHAMINE 30 MG/ML
30 INJECTION, SOLUTION INTRAMUSCULAR; INTRAVENOUS EVERY 6 HOURS
Status: DISPENSED | OUTPATIENT
Start: 2022-07-26 | End: 2022-07-27

## 2022-07-26 RX ORDER — ONDANSETRON 2 MG/ML
4 INJECTION INTRAMUSCULAR; INTRAVENOUS EVERY 6 HOURS PRN
Status: DISCONTINUED | OUTPATIENT
Start: 2022-07-26 | End: 2022-07-30

## 2022-07-26 RX ORDER — HYDROCODONE BITARTRATE AND ACETAMINOPHEN 7.5; 325 MG/1; MG/1
1 TABLET ORAL EVERY 6 HOURS PRN
Status: ACTIVE | OUTPATIENT
Start: 2022-07-26 | End: 2022-07-27

## 2022-07-26 RX ORDER — HALOPERIDOL 5 MG/ML
0.5 INJECTION INTRAMUSCULAR ONCE AS NEEDED
Status: ACTIVE | OUTPATIENT
Start: 2022-07-26 | End: 2022-07-26

## 2022-07-26 RX ORDER — HYDROMORPHONE HYDROCHLORIDE 1 MG/ML
0.6 INJECTION, SOLUTION INTRAMUSCULAR; INTRAVENOUS; SUBCUTANEOUS
Status: ACTIVE | OUTPATIENT
Start: 2022-07-26 | End: 2022-07-27

## 2022-07-26 RX ORDER — FAMOTIDINE 20 MG/1
20 TABLET, FILM COATED ORAL ONCE
Status: COMPLETED | OUTPATIENT
Start: 2022-07-26 | End: 2022-07-26

## 2022-07-26 RX ORDER — CHOLECALCIFEROL (VITAMIN D3) 25 MCG
1 TABLET,CHEWABLE ORAL DAILY
Status: DISCONTINUED | OUTPATIENT
Start: 2022-07-26 | End: 2022-07-30

## 2022-07-26 RX ORDER — DEXTROSE MONOHYDRATE 25 G/50ML
50 INJECTION, SOLUTION INTRAVENOUS
OUTPATIENT
Start: 2022-07-26

## 2022-07-26 RX ORDER — TRISODIUM CITRATE DIHYDRATE AND CITRIC ACID MONOHYDRATE 500; 334 MG/5ML; MG/5ML
30 SOLUTION ORAL ONCE
Status: COMPLETED | OUTPATIENT
Start: 2022-07-26 | End: 2022-07-26

## 2022-07-26 RX ORDER — MORPHINE SULFATE 1 MG/ML
INJECTION, SOLUTION EPIDURAL; INTRATHECAL; INTRAVENOUS
Status: COMPLETED | OUTPATIENT
Start: 2022-07-26 | End: 2022-07-26

## 2022-07-26 RX ORDER — HYDROMORPHONE HYDROCHLORIDE 1 MG/ML
0.6 INJECTION, SOLUTION INTRAMUSCULAR; INTRAVENOUS; SUBCUTANEOUS EVERY 5 MIN PRN
OUTPATIENT
Start: 2022-07-26

## 2022-07-26 RX ORDER — METOCLOPRAMIDE HYDROCHLORIDE 5 MG/ML
INJECTION INTRAMUSCULAR; INTRAVENOUS
Status: COMPLETED
Start: 2022-07-26 | End: 2022-07-26

## 2022-07-26 RX ORDER — SODIUM CHLORIDE, SODIUM LACTATE, POTASSIUM CHLORIDE, CALCIUM CHLORIDE 600; 310; 30; 20 MG/100ML; MG/100ML; MG/100ML; MG/100ML
125 INJECTION, SOLUTION INTRAVENOUS CONTINUOUS
Status: DISCONTINUED | OUTPATIENT
Start: 2022-07-26 | End: 2022-07-26 | Stop reason: HOSPADM

## 2022-07-26 RX ORDER — FAMOTIDINE 10 MG/ML
INJECTION, SOLUTION INTRAVENOUS
Status: COMPLETED
Start: 2022-07-26 | End: 2022-07-26

## 2022-07-26 RX ORDER — MORPHINE SULFATE 2 MG/ML
2 INJECTION, SOLUTION INTRAMUSCULAR; INTRAVENOUS EVERY 10 MIN PRN
OUTPATIENT
Start: 2022-07-26

## 2022-07-26 RX ORDER — ACETAMINOPHEN 325 MG/1
650 TABLET ORAL EVERY 6 HOURS PRN
Status: ACTIVE | OUTPATIENT
Start: 2022-07-26 | End: 2022-07-27

## 2022-07-26 RX ORDER — CEFAZOLIN SODIUM/WATER 2 G/20 ML
2 SYRINGE (ML) INTRAVENOUS ONCE
Status: COMPLETED | OUTPATIENT
Start: 2022-07-26 | End: 2022-07-26

## 2022-07-26 RX ORDER — NICOTINE POLACRILEX 4 MG
15 LOZENGE BUCCAL
OUTPATIENT
Start: 2022-07-26

## 2022-07-26 RX ORDER — HYDROCODONE BITARTRATE AND ACETAMINOPHEN 7.5; 325 MG/1; MG/1
2 TABLET ORAL EVERY 6 HOURS PRN
Status: ACTIVE | OUTPATIENT
Start: 2022-07-26 | End: 2022-07-27

## 2022-07-26 RX ORDER — METOCLOPRAMIDE 10 MG/1
10 TABLET ORAL ONCE
Status: COMPLETED | OUTPATIENT
Start: 2022-07-26 | End: 2022-07-26

## 2022-07-26 RX ORDER — CEFAZOLIN SODIUM/WATER 2 G/20 ML
SYRINGE (ML) INTRAVENOUS
Status: DISPENSED
Start: 2022-07-26 | End: 2022-07-26

## 2022-07-26 RX ORDER — BUPIVACAINE HYDROCHLORIDE 7.5 MG/ML
INJECTION, SOLUTION INTRASPINAL
Status: COMPLETED | OUTPATIENT
Start: 2022-07-26 | End: 2022-07-26

## 2022-07-26 RX ORDER — DIPHENHYDRAMINE HYDROCHLORIDE 50 MG/ML
12.5 INJECTION INTRAMUSCULAR; INTRAVENOUS EVERY 4 HOURS PRN
Status: ACTIVE | OUTPATIENT
Start: 2022-07-26 | End: 2022-07-27

## 2022-07-26 RX ORDER — HYDROMORPHONE HYDROCHLORIDE 1 MG/ML
0.2 INJECTION, SOLUTION INTRAMUSCULAR; INTRAVENOUS; SUBCUTANEOUS EVERY 2 HOUR PRN
Status: DISCONTINUED | OUTPATIENT
Start: 2022-07-26 | End: 2022-07-30

## 2022-07-26 RX ORDER — NICOTINE POLACRILEX 4 MG
30 LOZENGE BUCCAL
OUTPATIENT
Start: 2022-07-26

## 2022-07-26 RX ORDER — METOCLOPRAMIDE HYDROCHLORIDE 5 MG/ML
10 INJECTION INTRAMUSCULAR; INTRAVENOUS ONCE
Status: COMPLETED | OUTPATIENT
Start: 2022-07-26 | End: 2022-07-26

## 2022-07-26 RX ORDER — MIDAZOLAM HYDROCHLORIDE 1 MG/ML
INJECTION INTRAMUSCULAR; INTRAVENOUS AS NEEDED
Status: DISCONTINUED | OUTPATIENT
Start: 2022-07-26 | End: 2022-07-26 | Stop reason: SURG

## 2022-07-26 RX ORDER — DIPHENHYDRAMINE HCL 25 MG
25 CAPSULE ORAL EVERY 4 HOURS PRN
Status: ACTIVE | OUTPATIENT
Start: 2022-07-26 | End: 2022-07-27

## 2022-07-26 RX ORDER — HYDROMORPHONE HYDROCHLORIDE 1 MG/ML
0.2 INJECTION, SOLUTION INTRAMUSCULAR; INTRAVENOUS; SUBCUTANEOUS EVERY 5 MIN PRN
OUTPATIENT
Start: 2022-07-26

## 2022-07-26 RX ORDER — ONDANSETRON 2 MG/ML
4 INJECTION INTRAMUSCULAR; INTRAVENOUS ONCE AS NEEDED
Status: ACTIVE | OUTPATIENT
Start: 2022-07-26 | End: 2022-07-26

## 2022-07-26 RX ORDER — ACETAMINOPHEN 500 MG
1000 TABLET ORAL ONCE
Status: COMPLETED | OUTPATIENT
Start: 2022-07-26 | End: 2022-07-26

## 2022-07-26 RX ORDER — DOCUSATE SODIUM 100 MG/1
100 CAPSULE, LIQUID FILLED ORAL
Status: DISCONTINUED | OUTPATIENT
Start: 2022-07-26 | End: 2022-07-30

## 2022-07-26 RX ORDER — NALBUPHINE HCL 10 MG/ML
2.5 AMPUL (ML) INJECTION EVERY 4 HOURS PRN
Status: ACTIVE | OUTPATIENT
Start: 2022-07-26 | End: 2022-07-27

## 2022-07-26 RX ORDER — MORPHINE SULFATE 10 MG/ML
6 INJECTION, SOLUTION INTRAMUSCULAR; INTRAVENOUS EVERY 10 MIN PRN
OUTPATIENT
Start: 2022-07-26

## 2022-07-26 RX ORDER — HYDROMORPHONE HYDROCHLORIDE 1 MG/ML
0.4 INJECTION, SOLUTION INTRAMUSCULAR; INTRAVENOUS; SUBCUTANEOUS
Status: ACTIVE | OUTPATIENT
Start: 2022-07-26 | End: 2022-07-27

## 2022-07-26 RX ORDER — IBUPROFEN 600 MG/1
600 TABLET ORAL EVERY 6 HOURS
Status: DISCONTINUED | OUTPATIENT
Start: 2022-07-27 | End: 2022-07-30

## 2022-07-26 RX ORDER — SIMETHICONE 80 MG
80 TABLET,CHEWABLE ORAL 3 TIMES DAILY PRN
Status: DISCONTINUED | OUTPATIENT
Start: 2022-07-26 | End: 2022-07-30

## 2022-07-26 RX ORDER — AMMONIA INHALANTS 0.04 G/.3ML
0.3 INHALANT RESPIRATORY (INHALATION) AS NEEDED
Status: DISCONTINUED | OUTPATIENT
Start: 2022-07-26 | End: 2022-07-30

## 2022-07-26 RX ORDER — PROCHLORPERAZINE EDISYLATE 5 MG/ML
5 INJECTION INTRAMUSCULAR; INTRAVENOUS ONCE AS NEEDED
Status: COMPLETED | OUTPATIENT
Start: 2022-07-26 | End: 2022-07-26

## 2022-07-26 RX ORDER — NALOXONE HYDROCHLORIDE 0.4 MG/ML
0.08 INJECTION, SOLUTION INTRAMUSCULAR; INTRAVENOUS; SUBCUTANEOUS
Status: ACTIVE | OUTPATIENT
Start: 2022-07-26 | End: 2022-07-27

## 2022-07-26 RX ORDER — GABAPENTIN 300 MG/1
300 CAPSULE ORAL EVERY 8 HOURS PRN
Status: DISCONTINUED | OUTPATIENT
Start: 2022-07-26 | End: 2022-07-30

## 2022-07-26 RX ADMIN — ONDANSETRON 4 MG: 2 INJECTION INTRAMUSCULAR; INTRAVENOUS at 08:11:00

## 2022-07-26 RX ADMIN — MORPHINE SULFATE 0.3 MG: 1 INJECTION, SOLUTION EPIDURAL; INTRATHECAL; INTRAVENOUS at 07:55:00

## 2022-07-26 RX ADMIN — LIDOCAINE HYDROCHLORIDE 5 ML: 10 INJECTION, SOLUTION INFILTRATION; PERINEURAL at 07:55:00

## 2022-07-26 RX ADMIN — EPHEDRINE SULFATE 10 MG: 50 INJECTION INTRAVENOUS at 08:00:00

## 2022-07-26 RX ADMIN — KETOROLAC TROMETHAMINE 30 MG: 30 INJECTION, SOLUTION INTRAMUSCULAR; INTRAVENOUS at 08:28:00

## 2022-07-26 RX ADMIN — CEFAZOLIN SODIUM/WATER 2 G: 2 G/20 ML SYRINGE (ML) INTRAVENOUS at 07:42:00

## 2022-07-26 RX ADMIN — BUPIVACAINE HYDROCHLORIDE 1.6 ML: 7.5 INJECTION, SOLUTION INTRASPINAL at 07:55:00

## 2022-07-26 RX ADMIN — SODIUM CHLORIDE, SODIUM LACTATE, POTASSIUM CHLORIDE, CALCIUM CHLORIDE: 600; 310; 30; 20 INJECTION, SOLUTION INTRAVENOUS at 07:42:00

## 2022-07-26 RX ADMIN — SODIUM CHLORIDE, SODIUM LACTATE, POTASSIUM CHLORIDE, CALCIUM CHLORIDE: 600; 310; 30; 20 INJECTION, SOLUTION INTRAVENOUS at 08:15:00

## 2022-07-26 RX ADMIN — MIDAZOLAM HYDROCHLORIDE 2 MG: 1 INJECTION INTRAMUSCULAR; INTRAVENOUS at 08:12:00

## 2022-07-26 NOTE — PLAN OF CARE
Patient transferred to mother/baby room 368 per cart in stable condition baby in [facility] 8 and personal belongings. Accompanied by  and staff. Report given to mother/baby RN.

## 2022-07-26 NOTE — ANESTHESIA PROCEDURE NOTES
Spinal Block    Date/Time: 7/26/2022 7:55 AM  Performed by: Michelet Spangler MD  Authorized by: Michelet Spangler MD       General Information and Staff    Start Time:  7/26/2022 7:45 AM  End Time:  7/26/2022 7:55 AM  Anesthesiologist:  Michelet Spangler MD  Performed by:   Anesthesiologist  Patient Location:  OR  Site identification: surface landmarks    Reason for Block: at surgeon's request, post-op pain management and surgical anesthesia    Preanesthetic Checklist: patient identified, IV checked, risks and benefits discussed, monitors and equipment checked, pre-op evaluation, timeout performed, anesthesia consent and sterile technique used      Procedure Details    Patient Position:  Sitting  Prep: Betadine and patient draped    Monitoring:  Heart rate, cardiac monitor and continuous pulse ox  Approach:  Midline  Location:  L3-4  Injection Technique:  Single-shot    Needle    Needle Type:  Sprotte  Needle Gauge:  24 G  Needle Length:  3.5 in  Needle Insertion Depth:  7    Assessment    Sensory Level:  T6  Events: clear CSF, CSF aspirated, well tolerated and blood negative      Additional Comments

## 2022-07-26 NOTE — LACTATION NOTE
LACTATION NOTE - MOTHER      Evaluation Type: Inpatient    Problems identified  Problems identified: Milk supply WNL; Knowledge deficit  Problems Identified Other: Maternal/infant seperation (SCN)    Maternal history  Maternal history: Caesarean section    Breastfeeding goal  Breastfeeding goal: To maintain breast milk feeding per patient goal    Maternal Assessment  Bilateral Breasts: Soft;Symmetrical  Bilateral Nipples: Colostrum easily expressed; Everted  Left Nipple: Large (Using 28.5 mm sized flange on left breast)  Prior breastfeeding experience (comment below): Multip; Successful  Prior BF experience: comment: 15 months & 18 months; used nipple shield w/ first but able to tranision off  Breastfeeding Assistance: Breastfeeding assistance provided with permission    Pain assessment  Pain scale comment: denies    Guidelines for use of:  Equipment: Lanolin  Breast pump type: Ameda Platinum;Spectra  Suggested use of pump: Pump 8-12X/24hr  Reported pumping volumes (ml): 60  Other (comment): Breast pump initiated in Recovery Room. Discussed guidelines for pumping, pump settings, cleaning of parts, BM labels and documentation, and transport of EBM to SCN. With first pumping session mom expressed 60 mls of BM. Enc pumping every 2-3 hours, and to call [facility] for additional support.

## 2022-07-26 NOTE — PLAN OF CARE

## 2022-07-26 NOTE — OPERATIVE REPORT
[facility]     Section Delivery / Operative Note    Mario Reis Patient Status:  Inpatient    10/28/1980 MRN [id number]   Location [address] Attending Concetta Scanlon MD   Hosp Day # 0 PCP No primary care provider on file. Pre Op Diagnosis:  IUP at 39 wks, h/o C/S x 2, Breech presentation    Post Op Diagnosis: Same as pre-op    Procedure:  repeat  LTCS     Surgeon:  Patrick Pederson MD    Assistant Surgeon:  Shweta Oglesby MD     Anesthesia: Spinal    Complications: none     Antibiotics:  Ancef 2 grams preoperatively    EBL: * No blood loss amount entered *    Specimens:   Specimens (From admission, onward)    None          Findings: normal uterus, normal tubes bilaterally, normal ovaries bilaterally. Live male infant, Apgars 7 & 9, weight 10 lbs, 10 oz delivered in complete breech presentation. Nuchal Cord:  single nuchal  clear amniotic fluid noted. Infant:  Date of Delivery: 2022   Time of Delivery: 8:08 AM  Delivery Type: Caesarean Section    Infant Sex  Information for the patient's : Bjorn Acevedo [[id number]]   male    Infant Birthweight  Information for the patient's : Ashlee Chand Boy [[id number]]   10 lb 9.7 oz (4.81 kg)     Presentation Breech [3]  Position          Apgars:  1 minute: 7               5 minutes: 9                        10 minutes:      Placenta:  Date/Time of Delivery: 2022  8:10 AM   Delivery: spontaneous  Placenta to Pathology: yes    Cord:  Cord Gases Submitted: yes  Cord Blood/Tissue Collection: no  Cord Complications: single nuchal    Sponge and Needle Counts:  Verified    Operative note: The patient was prepped and draped in the normal usual sterile fashion after SCDs & jang catheter placed. A time out was performed. After adequate level of anesthesia was ascertained, a Pfannenstiel skin incision was made and extended down to the level of the fascia.  The fascia was incised and extended bilaterally with curved Easley scissors. The rectus muscles were  superiorly and inferiorly. The peritoneal cavity was entered with blunt dissection superiorly and extended inferiorly, with good visualization of bladder at all times. A bladder blade was inserted, bladder flap created and bladder blade replaced. The uterus was scored in the midline. clear encountered. The lower uterine incision was extended laterally & superiorly bluntly. The infant was delivered via standard breech maneuvers. The infant's head was guided through the incision while fundal pressure was applied by assistant. The infant's mouth & naso cavities were bulb suctioned. Nuchal x 1 was released. The cord was doubly clamped and cut. The infant was shown to the parents and placed in the radiant warmer where neonatology was present to evaluate the infant. Cord blood was obtained. Manual removal of placenta was performed. The uterus was externalized. Uterus, tubes and ovaries were normal in appearance. The uterine cavity was cleaned of all clots and debris using a wet lap. The cervix was dilated with a ring forcep which was then passed off of the field. The bladder blade was replaced. The edges of uterine incision was grasped with ring forceps. The uterus was closed in two layer fashion, first being interlocking, the second being imbricating using 0-Vicryl. The incision was inspected for hemostasis. The cul de sac was cleared of all clots / debris. The uterus was replaced into the abdominal cavity and the gutters were swept clean. Re-inspection of the hysterotomy, peritomeum and rectus muscles noted them to be entirely hemostatic. The fascia was closed in two halves using 0 Vicryl. The subcutaneous tissue was copiously irrigated and any bleeding cauterized. The subcutaneous tissue was closed using 2.0 plain gut. The skin was closed using 4-0 vicryl with steristrips applied. Pressure dressing was applied.   All sponge, instrument and needle counts were correct x 2. The patient tolerated the procedure well and was taken to recovery room in alert & stable fashion.       Traci Martinez MD  7/26/2022  8:56 AM

## 2022-07-27 LAB
BASOPHILS # BLD AUTO: 0.02 X10(3) UL (ref 0–0.2)
BASOPHILS NFR BLD AUTO: 0.2 %
DEPRECATED RDW RBC AUTO: 46.1 FL (ref 35.1–46.3)
EOSINOPHIL # BLD AUTO: 0.09 X10(3) UL (ref 0–0.7)
EOSINOPHIL NFR BLD AUTO: 0.8 %
ERYTHROCYTE [DISTWIDTH] IN BLOOD BY AUTOMATED COUNT: 16.2 % (ref 11–15)
HCT VFR BLD AUTO: 27.7 %
HGB BLD-MCNC: 8.3 G/DL
IMM GRANULOCYTES # BLD AUTO: 0.07 X10(3) UL (ref 0–1)
IMM GRANULOCYTES NFR BLD: 0.6 %
LYMPHOCYTES # BLD AUTO: 1.29 X10(3) UL (ref 1–4)
LYMPHOCYTES NFR BLD AUTO: 11.8 %
MCH RBC QN AUTO: 23.6 PG (ref 26–34)
MCHC RBC AUTO-ENTMCNC: 30 G/DL (ref 31–37)
MCV RBC AUTO: 78.9 FL
MONOCYTES # BLD AUTO: 1 X10(3) UL (ref 0.1–1)
MONOCYTES NFR BLD AUTO: 9.2 %
NEUTROPHILS # BLD AUTO: 8.42 X10 (3) UL (ref 1.5–7.7)
NEUTROPHILS # BLD AUTO: 8.42 X10(3) UL (ref 1.5–7.7)
NEUTROPHILS NFR BLD AUTO: 77.4 %
PLATELET # BLD AUTO: 168 10(3)UL (ref 150–450)
RBC # BLD AUTO: 3.51 X10(6)UL
WBC # BLD AUTO: 10.9 X10(3) UL (ref 4–11)

## 2022-07-27 PROCEDURE — 85025 COMPLETE CBC W/AUTO DIFF WBC: CPT | Performed by: OBSTETRICS & GYNECOLOGY

## 2022-07-27 PROCEDURE — 51798 US URINE CAPACITY MEASURE: CPT

## 2022-07-27 NOTE — LACTATION NOTE
LACTATION NOTE - MOTHER      Evaluation Type: Inpatient    Problems identified  Problems identified: Milk supply WNL; Knowledge deficit  Problems Identified Other: Maternal/infant seperation (SCN)    Maternal history  Maternal history: Caesarean section    Breastfeeding goal  Breastfeeding goal: To maintain breast milk feeding per patient goal    Maternal Assessment  Bilateral Breasts: Soft;Symmetrical  Bilateral Nipples: Colostrum easily expressed; Everted  Right Nipple: Sore  Left Nipple: Large (Using 28.5 mm sized flange on left breast)  Prior breastfeeding experience (comment below): Multip; Successful  Prior BF experience: comment: 15 months & 18 months; used nipple shield w/ first but able to tranision off  Breastfeeding Assistance: Breastfeeding assistance provided with permission    Pain assessment  Pain, additional: Pain w/pumping  Pain Location: Breasts  Location/Comment: right nipple  Pain scale comment: denies    Guidelines for use of:  Equipment: Lanolin  Breast pump type: Ameda Platinum;Spectra  Suggested use of pump: Pump 8-12X/24hr  Reported pumping volumes (ml): 60  Other (comment): Mom requesting [address] [facility] assistance for infant's next feeding at 1230 in [facility]. Reports some discomort to right nipple with pumping, and not seeing any EBM with pumping but is getting 20-30 from right breast. Currently using 28.5 mm flange on left breast and standard 25 mm flange on right breast. Recommended using 28.5 mm flange on both breasts with next pumping session, and to bring equipment to [facility] at next feeding for flange assessment after latch assessment.

## 2022-07-27 NOTE — PLAN OF CARE
Pt has increase in pain from c/s.   Will give patient more time with  pain management before removing jang

## 2022-07-27 NOTE — LACTATION NOTE
This note was copied from a baby's chart. LACTATION NOTE - INFANT    Evaluation Type  Evaluation Type: NICU/SCN    Problems & Assessment  Problems Diagnosed or Identified: Currently in NICU/SCN  Infant Assessment: Hunger cues present  Muscle tone: Appropriate for GA    Feeding Assessment  Summary Current Feeding: PO/NG;Breastfeeding with formula supplement;Breastfeeding with breast milk supplement  Breastfeeding Assessment: Assisted with breastfeeding w/mother's permission;Coordinated suck/swallow; Tolerated feeding well;Sustained nutrititive latch w/audible swallows  Breastfeeding Positions: cradle;football;cross cradle;left breast  Latch: Repeated attempts, hold nipple in mouth, stimulate to suck  Audible Sucks/Swallows: Spontaneous and intermittent (24 hours old)  Type of Nipple: Everted (after stimulation)  Comfort (Breast/Nipple): Filling, red/small blisters/bruises, mild/mod discomfort  Hold (Positioning): Full assist, teach one side, mother does other, staff holds  [facility] Score: 7  Other (comment): Latch assistance provided, mom holding infant in cradle position. Demonstrated STS, waking maneuvers, and deep latch technique. Due to infant's large size and mom's anatomy infant repositioned in football hold, mom able to provide good support for infant. Latch sustained with audible swallows. Demonstrated hand expression and intermittent breast compressions to facilitate milk ejection reflex. Mom instructed to pump following feeding.

## 2022-07-28 RX ORDER — HYDROCODONE BITARTRATE AND ACETAMINOPHEN 10; 325 MG/1; MG/1
1 TABLET ORAL EVERY 4 HOURS
Status: DISCONTINUED | OUTPATIENT
Start: 2022-07-28 | End: 2022-07-29

## 2022-07-28 RX ORDER — HYDROCODONE BITARTRATE AND ACETAMINOPHEN 10; 325 MG/1; MG/1
1 TABLET ORAL EVERY 4 HOURS PRN
Status: DISCONTINUED | OUTPATIENT
Start: 2022-07-28 | End: 2022-07-28

## 2022-07-28 NOTE — PLAN OF CARE
Problem: PAIN - ADULT  Goal: Verbalizes/displays adequate comfort level or patient's stated pain goal  Description: INTERVENTIONS:  - Encourage pt to monitor pain and request assistance  - Assess pain using appropriate pain scale  - Administer analgesics based on type and severity of pain and evaluate response  - Implement non-pharmacological measures as appropriate and evaluate response  - Consider cultural and social influences on pain and pain management  - Manage/alleviate anxiety  - Utilize distraction and/or relaxation techniques  - Monitor for opioid side effects  - Notify MD/LIP if interventions unsuccessful or patient reports new pain  - Anticipate increased pain with activity and pre-medicate as appropriate  Outcome: Progressing     Problem: ANXIETY  Goal: Will report anxiety at manageable levels  Description: INTERVENTIONS:  - Administer medication as ordered  - Teach and rehearse alternative coping skills  - Provide emotional support with 1:1 interaction with staff  Outcome: Progressing     Problem: SKIN/TISSUE INTEGRITY - ADULT  Goal: Incision(s), wounds(s) or drain site(s) healing without S/S of infection  Description: INTERVENTIONS:  - Assess and document risk factors for pressure ulcer development  - Assess and document skin integrity  - Assess and document dressing/incision, wound bed, drain sites and surrounding tissue  - Implement wound care per orders  - Initiate isolation precautions as appropriate  - Initiate Pressure Ulcer prevention bundle as indicated  Outcome: Progressing     Problem: POSTPARTUM  Goal: Long Term Goal:Experiences normal postpartum course  Description: INTERVENTIONS:  - Assess and monitor vital signs and lab values. - Assess fundus and lochia. - Provide ice/sitz baths for perineum discomfort. - Monitor healing of incision/episiotomy/laceration, and assess for signs and symptoms of infection and hematoma. - Assess bladder function and monitor for bladder distention.   - Provide/instruct/assist with pericare as needed. - Provide VTE prophylaxis as needed. - Monitor bowel function.  - Encourage ambulation and provide assistance as needed. - Assess and monitor emotional status and provide social service/psych resources as needed. - Utilize standard precautions and use personal protective equipment as indicated. Ensure aseptic care of all intravenous lines and invasive tubes/drains.  - Obtain immunization and exposure to communicable diseases history. Outcome: Progressing  Goal: Optimize infant feeding at the breast  Description: INTERVENTIONS:  - Initiate breast feeding within first hour after birth. - Monitor effectiveness of current breast feeding efforts. - Assess support systems available to mother/family.  - Identify cultural beliefs/practices regarding lactation, letdown techniques, maternal food preferences. - Assess mother's knowledge and previous experience with breast feeding.  - Provide information as needed about early infant feeding cues (e.g., rooting, lip smacking, sucking fingers/hand) versus late cue of crying.  - Discuss/demonstrate breast feeding aids (e.g., infant sling, nursing footstool/pillows, and breast pumps). - Encourage mother/other family members to express feelings/concerns, and actively listen. - Educate father/SO about benefits of breast feeding and how to manage common lactation challenges. - Recommend avoidance of specific medications or substances incompatible with breast feeding.  - Assess and monitor for signs of nipple pain/trauma. - Instruct and provide assistance with proper latch. - Review techniques for milk expression (breast pumping) and storage of breast milk. Provide pumping equipment/supplies, instructions and assistance, as needed. - Encourage rooming-in and breast feeding on demand.  - Encourage skin-to-skin contact. - Provide LC support as needed. - Assess for and manage engorgement.   - Provide breast feeding education handouts and information on community breast feeding support. Outcome: Progressing  Goal: Establishment of adequate milk supply with medication/procedure interruptions  Description: INTERVENTIONS:  - Review techniques for milk expression (breast pumping). - Provide pumping equipment/supplies, instructions, and assistance until it is safe to breastfeed infant. Outcome: Progressing  Goal: Appropriate maternal -  bonding  Description: INTERVENTIONS:  - Assess caregiver- interactions. - Assess caregiver's emotional status and coping mechanisms. - Encourage caregiver to participate in  daily care. - Assess support systems available to mother/family.  - Provide /case management support as needed.   Outcome: Progressing

## 2022-07-28 NOTE — PLAN OF CARE
Problem: PAIN - ADULT  Goal: Verbalizes/displays adequate comfort level or patient's stated pain goal  Description: INTERVENTIONS:  - Encourage pt to monitor pain and request assistance  - Assess pain using appropriate pain scale  - Administer analgesics based on type and severity of pain and evaluate response  - Implement non-pharmacological measures as appropriate and evaluate response  - Consider cultural and social influences on pain and pain management  - Manage/alleviate anxiety  - Utilize distraction and/or relaxation techniques  - Monitor for opioid side effects  - Notify MD/LIP if interventions unsuccessful or patient reports new pain  - Anticipate increased pain with activity and pre-medicate as appropriate  Outcome: Progressing     Problem: ANXIETY  Goal: Will report anxiety at manageable levels  Description: INTERVENTIONS:  - Administer medication as ordered  - Teach and rehearse alternative coping skills  - Provide emotional support with 1:1 interaction with staff  Outcome: Progressing     Problem: Patient Centered Care  Goal: Patient preferences are identified and integrated in the patient's plan of care  Description: Interventions:  - What would you like us to know as we care for you?   - Provide timely, complete, and accurate information to patient/family  - Incorporate patient and family knowledge, values, beliefs, and cultural backgrounds into the planning and delivery of care  - Encourage patient/family to participate in care and decision-making at the level they choose  - Honor patient and family perspectives and choices  Outcome: Progressing     Problem: Patient/Family Goals  Goal: Patient/Family Long Term Goal  Description: Patient's Long Term Goal:     Interventions:  -   - See additional Care Plan goals for specific interventions  Outcome: Progressing  Goal: Patient/Family Short Term Goal  Description: Patient's Short Term Goal:     Interventions:   -  - See additional Care Plan goals for specific interventions  Outcome: Progressing     Problem: SKIN/TISSUE INTEGRITY - ADULT  Goal: Incision(s), wounds(s) or drain site(s) healing without S/S of infection  Description: INTERVENTIONS:  - Assess and document risk factors for pressure ulcer development  - Assess and document skin integrity  - Assess and document dressing/incision, wound bed, drain sites and surrounding tissue  - Implement wound care per orders  - Initiate isolation precautions as appropriate  - Initiate Pressure Ulcer prevention bundle as indicated  Outcome: Progressing     Problem: POSTPARTUM  Goal: Long Term Goal:Experiences normal postpartum course  Description: INTERVENTIONS:  - Assess and monitor vital signs and lab values. - Assess fundus and lochia. - Provide ice/sitz baths for perineum discomfort. - Monitor healing of incision/episiotomy/laceration, and assess for signs and symptoms of infection and hematoma. - Assess bladder function and monitor for bladder distention.  - Provide/instruct/assist with pericare as needed. - Provide VTE prophylaxis as needed. - Monitor bowel function.  - Encourage ambulation and provide assistance as needed. - Assess and monitor emotional status and provide social service/psych resources as needed. - Utilize standard precautions and use personal protective equipment as indicated. Ensure aseptic care of all intravenous lines and invasive tubes/drains.  - Obtain immunization and exposure to communicable diseases history. Outcome: Progressing  Goal: Optimize infant feeding at the breast  Description: INTERVENTIONS:  - Initiate breast feeding within first hour after birth. - Monitor effectiveness of current breast feeding efforts. - Assess support systems available to mother/family.  - Identify cultural beliefs/practices regarding lactation, letdown techniques, maternal food preferences.   - Assess mother's knowledge and previous experience with breast feeding.  - Provide information as needed about early infant feeding cues (e.g., rooting, lip smacking, sucking fingers/hand) versus late cue of crying.  - Discuss/demonstrate breast feeding aids (e.g., infant sling, nursing footstool/pillows, and breast pumps). - Encourage mother/other family members to express feelings/concerns, and actively listen. - Educate father/SO about benefits of breast feeding and how to manage common lactation challenges. - Recommend avoidance of specific medications or substances incompatible with breast feeding.  - Assess and monitor for signs of nipple pain/trauma. - Instruct and provide assistance with proper latch. - Review techniques for milk expression (breast pumping) and storage of breast milk. Provide pumping equipment/supplies, instructions and assistance, as needed. - Encourage rooming-in and breast feeding on demand.  - Encourage skin-to-skin contact. - Provide LC support as needed. - Assess for and manage engorgement. - Provide breast feeding education handouts and information on community breast feeding support. Outcome: Progressing  Goal: Establishment of adequate milk supply with medication/procedure interruptions  Description: INTERVENTIONS:  - Review techniques for milk expression (breast pumping). - Provide pumping equipment/supplies, instructions, and assistance until it is safe to breastfeed infant. Outcome: Progressing  Goal: Appropriate maternal -  bonding  Description: INTERVENTIONS:  - Assess caregiver- interactions. - Assess caregiver's emotional status and coping mechanisms. - Encourage caregiver to participate in  daily care. - Assess support systems available to mother/family.  - Provide /case management support as needed.   Outcome: Progressing

## 2022-07-29 RX ORDER — HYDROCODONE BITARTRATE AND ACETAMINOPHEN 10; 325 MG/1; MG/1
2 TABLET ORAL EVERY 4 HOURS
Status: DISCONTINUED | OUTPATIENT
Start: 2022-07-29 | End: 2022-07-30

## 2022-07-29 NOTE — LACTATION NOTE
LACTATION NOTE - MOTHER      Evaluation Type: Inpatient    Problems identified  Problems identified: Knowledge deficit;Milk supply WNL    Maternal history  Maternal history: Caesarean section    Breastfeeding goal  Breastfeeding goal: To maintain breast milk feeding per patient goal    Maternal Assessment  Bilateral Breasts: Filling;Symmetrical  Bilateral Nipples: Everted  Prior breastfeeding experience (comment below): Multip; Successful  Prior BF experience: comment: 15 months & 18 months; used nipple shield w/ first but able to transition off  Breastfeeding Assistance: Breastfeeding assistance provided with permission         Guidelines for use of:  Breast pump type: [employer]meda Platinum  Suggested use of pump: For comfort as needed;Pump if infant is not latching to breast  Other (comment): upon entering room, infant at the breast, but mom having some difficulty getting him latched on. Attempted with nipple shield unsuccessfully. Mom upset because he had been latching well yesterday, but he had bottle fed overnight which she felt resulted in his current difficulty. Eventually, this author dripped EBM onto nipple to entice infant to keep sucking until milk flow let down. This method worked well to keep him calm and engaged at the breast. Active sucking and swallowing observed. Mom visibly more relaxed once infant feeding calmly. Encouraged frequent feedings and frequent skin to skin contact with infant to catch him when he's exhibiting early feeding cues and might be more patient waiting for her milk to let down. Encouraged to call for further assistance, as needed.

## 2022-07-29 NOTE — LACTATION NOTE
This note was copied from a baby's chart. LACTATION NOTE - INFANT    Evaluation Type  Evaluation Type: Inpatient    Problems & Assessment  Problems Diagnosed or Identified: Hx of NICU/SCN admission; Latch difficulty  Infant Assessment: Skin color: pink or appropriate for ethnicity;Hunger cues present  Muscle tone: Appropriate for GA    Feeding Assessment  Summary Current Feeding: Adlib;Breastfeeding with breast milk supplement  Last 24 hour feeding summary: breast fed welll upon return from [facility], but having difficulty latching in the morning after bottle feeding during the night. Mom is pumping. Milk 'coming in\"  Breastfeeding Assessment: Assisted with breastfeeding w/mother's permission;Coordinated suck/swallow;Deep latch achieved and observed; Nipple shield initiated with non-nutritive suckling;Sustained nutrititive latch w/audible swallows; Tolerated feeding well  Breastfeeding Positions: cradle;right breast;left breast  Latch: Grasps breast, tongue down, lips flanged, rhythmic sucking  Audible Sucks/Swallows: Spontaneous and intermittent (24 hours old)  Type of Nipple: Everted (after stimulation)  Comfort (Breast/Nipple): Filling, red/small blisters/bruises, mild/mod discomfort  Hold (Positioning): Full assist, teach one side, mother does other, staff holds  [facility] Score: 8  Other (comment): upon entering room, infant at the breast, but mom having some difficulty getting him latched on. Attempted with nipple shield unsuccessfully. Mom upset because he had been latching well yesterday, but he had bottle fed overnight which she felt resulted in his current difficulty. Eventually, this author dripped EBM onto nipple to entice infant to keep sucking until milk flow let down. This method worked well to keep him calm and engaged at the breast. Active sucking and swallowing observed. Mom visibly more relaxed once infant feeding calmly.  Encouraged frequent feedings and frequent skin to skin contact with infant to catch him when he's exhibiting early feeding cues and might be more patient waiting for her milk to let down. Encouraged to call for further assistance, as needed.               Equipment used  Equipment used: Nipple Shield (briefly, but got infant latched without it)  Nipple shield size: 24 mm

## 2022-07-29 NOTE — PLAN OF CARE
Problem: PAIN - ADULT  Goal: Verbalizes/displays adequate comfort level or patient's stated pain goal  Description: INTERVENTIONS:  - Encourage pt to monitor pain and request assistance  - Assess pain using appropriate pain scale  - Administer analgesics based on type and severity of pain and evaluate response  - Implement non-pharmacological measures as appropriate and evaluate response  - Consider cultural and social influences on pain and pain management  - Manage/alleviate anxiety  - Utilize distraction and/or relaxation techniques  - Monitor for opioid side effects  - Notify MD/LIP if interventions unsuccessful or patient reports new pain  - Anticipate increased pain with activity and pre-medicate as appropriate  Outcome: Progressing     Problem: ANXIETY  Goal: Will report anxiety at manageable levels  Description: INTERVENTIONS:  - Administer medication as ordered  - Teach and rehearse alternative coping skills  - Provide emotional support with 1:1 interaction with staff  Outcome: Progressing     Problem: Patient Centered Care  Goal: Patient preferences are identified and integrated in the patient's plan of care  Description: Interventions:  - What would you like us to know as we care for you?   - Provide timely, complete, and accurate information to patient/family  - Incorporate patient and family knowledge, values, beliefs, and cultural backgrounds into the planning and delivery of care  - Encourage patient/family to participate in care and decision-making at the level they choose  - Honor patient and family perspectives and choices  Outcome: Progressing     Problem: Patient/Family Goals  Goal: Patient/Family Long Term Goal  Description: Patient's Long Term Goal:     Interventions:  -   - See additional Care Plan goals for specific interventions  Outcome: Progressing  Goal: Patient/Family Short Term Goal  Description: Patient's Short Term Goal:     Interventions:   -   - See additional Care Plan goals for specific interventions  Outcome: Progressing     Problem: SKIN/TISSUE INTEGRITY - ADULT  Goal: Incision(s), wounds(s) or drain site(s) healing without S/S of infection  Description: INTERVENTIONS:  - Assess and document risk factors for pressure ulcer development  - Assess and document skin integrity  - Assess and document dressing/incision, wound bed, drain sites and surrounding tissue  - Implement wound care per orders  - Initiate isolation precautions as appropriate  - Initiate Pressure Ulcer prevention bundle as indicated  Outcome: Progressing     Problem: POSTPARTUM  Goal: Long Term Goal:Experiences normal postpartum course  Description: INTERVENTIONS:  - Assess and monitor vital signs and lab values. - Assess fundus and lochia. - Provide ice/sitz baths for perineum discomfort. - Monitor healing of incision/episiotomy/laceration, and assess for signs and symptoms of infection and hematoma. - Assess bladder function and monitor for bladder distention.  - Provide/instruct/assist with pericare as needed. - Provide VTE prophylaxis as needed. - Monitor bowel function.  - Encourage ambulation and provide assistance as needed. - Assess and monitor emotional status and provide social service/psych resources as needed. - Utilize standard precautions and use personal protective equipment as indicated. Ensure aseptic care of all intravenous lines and invasive tubes/drains.  - Obtain immunization and exposure to communicable diseases history. Outcome: Progressing  Goal: Optimize infant feeding at the breast  Description: INTERVENTIONS:  - Initiate breast feeding within first hour after birth. - Monitor effectiveness of current breast feeding efforts. - Assess support systems available to mother/family.  - Identify cultural beliefs/practices regarding lactation, letdown techniques, maternal food preferences.   - Assess mother's knowledge and previous experience with breast feeding.  - Provide information as needed about early infant feeding cues (e.g., rooting, lip smacking, sucking fingers/hand) versus late cue of crying.  - Discuss/demonstrate breast feeding aids (e.g., infant sling, nursing footstool/pillows, and breast pumps). - Encourage mother/other family members to express feelings/concerns, and actively listen. - Educate father/SO about benefits of breast feeding and how to manage common lactation challenges. - Recommend avoidance of specific medications or substances incompatible with breast feeding.  - Assess and monitor for signs of nipple pain/trauma. - Instruct and provide assistance with proper latch. - Review techniques for milk expression (breast pumping) and storage of breast milk. Provide pumping equipment/supplies, instructions and assistance, as needed. - Encourage rooming-in and breast feeding on demand.  - Encourage skin-to-skin contact. - Provide LC support as needed. - Assess for and manage engorgement. - Provide breast feeding education handouts and information on community breast feeding support. Outcome: Progressing  Goal: Establishment of adequate milk supply with medication/procedure interruptions  Description: INTERVENTIONS:  - Review techniques for milk expression (breast pumping). - Provide pumping equipment/supplies, instructions, and assistance until it is safe to breastfeed infant. Outcome: Progressing  Goal: Experiences normal breast weaning course  Description: INTERVENTIONS:  - Assess for and manage engorgement. - Instruct on breast care. - Provide comfort measures. Outcome: Progressing  Goal: Appropriate maternal -  bonding  Description: INTERVENTIONS:  - Assess caregiver- interactions. - Assess caregiver's emotional status and coping mechanisms. - Encourage caregiver to participate in  daily care. - Assess support systems available to mother/family.  - Provide /case management support as needed.   Outcome: Progressing

## 2022-07-30 VITALS
HEART RATE: 76 BPM | OXYGEN SATURATION: 97 % | TEMPERATURE: 98 F | RESPIRATION RATE: 16 BRPM | SYSTOLIC BLOOD PRESSURE: 131 MMHG | DIASTOLIC BLOOD PRESSURE: 59 MMHG

## 2022-07-30 RX ORDER — HYDROCODONE BITARTRATE AND ACETAMINOPHEN 10; 325 MG/1; MG/1
2 TABLET ORAL EVERY 4 HOURS
Qty: 12 TABLET | Refills: 0 | Status: SHIPPED | OUTPATIENT
Start: 2022-07-30

## 2022-07-30 RX ORDER — IBUPROFEN 600 MG/1
600 TABLET ORAL EVERY 6 HOURS
Qty: 30 TABLET | Refills: 0 | Status: SHIPPED | OUTPATIENT
Start: 2022-07-30

## 2022-07-30 NOTE — PLAN OF CARE
Problem: PAIN - ADULT  Goal: Verbalizes/displays adequate comfort level or patient's stated pain goal  Description: INTERVENTIONS:  - Encourage pt to monitor pain and request assistance  - Assess pain using appropriate pain scale  - Administer analgesics based on type and severity of pain and evaluate response  - Implement non-pharmacological measures as appropriate and evaluate response  - Consider cultural and social influences on pain and pain management  - Manage/alleviate anxiety  - Utilize distraction and/or relaxation techniques  - Monitor for opioid side effects  - Notify MD/LIP if interventions unsuccessful or patient reports new pain  - Anticipate increased pain with activity and pre-medicate as appropriate  Outcome: Completed     Problem: ANXIETY  Goal: Will report anxiety at manageable levels  Description: INTERVENTIONS:  - Administer medication as ordered  - Teach and rehearse alternative coping skills  - Provide emotional support with 1:1 interaction with staff  Outcome: Completed     Problem: Patient Centered Care  Goal: Patient preferences are identified and integrated in the patient's plan of care  Description: Interventions:  - What would you like us to know as we care for you?   - Provide timely, complete, and accurate information to patient/family  - Incorporate patient and family knowledge, values, beliefs, and cultural backgrounds into the planning and delivery of care  - Encourage patient/family to participate in care and decision-making at the level they choose  - Honor patient and family perspectives and choices  Outcome: Completed     Problem: Patient/Family Goals  Goal: Patient/Family Long Term Goal  Description: Patient's Long Term Goal:     Interventions:  -   - See additional Care Plan goals for specific interventions  Outcome: Completed  Goal: Patient/Family Short Term Goal  Description: Patient's Short Term Goal:     Interventions:   -   - See additional Care Plan goals for specific interventions  Outcome: Completed     Problem: SKIN/TISSUE INTEGRITY - ADULT  Goal: Incision(s), wounds(s) or drain site(s) healing without S/S of infection  Description: INTERVENTIONS:  - Assess and document risk factors for pressure ulcer development  - Assess and document skin integrity  - Assess and document dressing/incision, wound bed, drain sites and surrounding tissue  - Implement wound care per orders  - Initiate isolation precautions as appropriate  - Initiate Pressure Ulcer prevention bundle as indicated  Outcome: Completed     Problem: POSTPARTUM  Goal: Long Term Goal:Experiences normal postpartum course  Description: INTERVENTIONS:  - Assess and monitor vital signs and lab values. - Assess fundus and lochia. - Provide ice/sitz baths for perineum discomfort. - Monitor healing of incision/episiotomy/laceration, and assess for signs and symptoms of infection and hematoma. - Assess bladder function and monitor for bladder distention.  - Provide/instruct/assist with pericare as needed. - Provide VTE prophylaxis as needed. - Monitor bowel function.  - Encourage ambulation and provide assistance as needed. - Assess and monitor emotional status and provide social service/psych resources as needed. - Utilize standard precautions and use personal protective equipment as indicated. Ensure aseptic care of all intravenous lines and invasive tubes/drains.  - Obtain immunization and exposure to communicable diseases history. Outcome: Completed  Goal: Optimize infant feeding at the breast  Description: INTERVENTIONS:  - Initiate breast feeding within first hour after birth. - Monitor effectiveness of current breast feeding efforts. - Assess support systems available to mother/family.  - Identify cultural beliefs/practices regarding lactation, letdown techniques, maternal food preferences.   - Assess mother's knowledge and previous experience with breast feeding.  - Provide information as needed about early infant feeding cues (e.g., rooting, lip smacking, sucking fingers/hand) versus late cue of crying.  - Discuss/demonstrate breast feeding aids (e.g., infant sling, nursing footstool/pillows, and breast pumps). - Encourage mother/other family members to express feelings/concerns, and actively listen. - Educate father/SO about benefits of breast feeding and how to manage common lactation challenges. - Recommend avoidance of specific medications or substances incompatible with breast feeding.  - Assess and monitor for signs of nipple pain/trauma. - Instruct and provide assistance with proper latch. - Review techniques for milk expression (breast pumping) and storage of breast milk. Provide pumping equipment/supplies, instructions and assistance, as needed. - Encourage rooming-in and breast feeding on demand.  - Encourage skin-to-skin contact. - Provide LC support as needed. - Assess for and manage engorgement. - Provide breast feeding education handouts and information on community breast feeding support. Outcome: Completed  Goal: Establishment of adequate milk supply with medication/procedure interruptions  Description: INTERVENTIONS:  - Review techniques for milk expression (breast pumping). - Provide pumping equipment/supplies, instructions, and assistance until it is safe to breastfeed infant. Outcome: Completed  Goal: Experiences normal breast weaning course  Description: INTERVENTIONS:  - Assess for and manage engorgement. - Instruct on breast care. - Provide comfort measures. Outcome: Completed  Goal: Appropriate maternal -  bonding  Description: INTERVENTIONS:  - Assess caregiver- interactions. - Assess caregiver's emotional status and coping mechanisms. - Encourage caregiver to participate in  daily care. - Assess support systems available to mother/family.  - Provide /case management support as needed.   Outcome: Completed

## 2022-07-30 NOTE — PLAN OF CARE
Problem: Patient Centered Care  Goal: Patient preferences are identified and integrated in the patient's plan of care  Description: Interventions:  - What would you like us to know as we care for you?   - Provide timely, complete, and accurate information to patient/family  - Incorporate patient and family knowledge, values, beliefs, and cultural backgrounds into the planning and delivery of care  - Encourage patient/family to participate in care and decision-making at the level they choose  - Honor patient and family perspectives and choices  Outcome: Progressing     Problem: Patient/Family Goals  Goal: Patient/Family Short Term Goal  Description: Patient's Short Term Goal:     Interventions:   -   - See additional Care Plan goals for specific interventions  Outcome: Progressing     Problem: SKIN/TISSUE INTEGRITY - ADULT  Goal: Incision(s), wounds(s) or drain site(s) healing without S/S of infection  Description: INTERVENTIONS:  - Assess and document risk factors for pressure ulcer development  - Assess and document skin integrity  - Assess and document dressing/incision, wound bed, drain sites and surrounding tissue  - Implement wound care per orders  - Initiate isolation precautions as appropriate  - Initiate Pressure Ulcer prevention bundle as indicated  Outcome: Progressing     Problem: POSTPARTUM  Goal: Long Term Goal:Experiences normal postpartum course  Description: INTERVENTIONS:  - Assess and monitor vital signs and lab values. - Assess fundus and lochia. - Provide ice/sitz baths for perineum discomfort. - Monitor healing of incision/episiotomy/laceration, and assess for signs and symptoms of infection and hematoma. - Assess bladder function and monitor for bladder distention.  - Provide/instruct/assist with pericare as needed. - Provide VTE prophylaxis as needed. - Monitor bowel function.  - Encourage ambulation and provide assistance as needed.   - Assess and monitor emotional status and provide social service/psych resources as needed. - Utilize standard precautions and use personal protective equipment as indicated. Ensure aseptic care of all intravenous lines and invasive tubes/drains.  - Obtain immunization and exposure to communicable diseases history. Outcome: Progressing  Goal: Optimize infant feeding at the breast  Description: INTERVENTIONS:  - Initiate breast feeding within first hour after birth. - Monitor effectiveness of current breast feeding efforts. - Assess support systems available to mother/family.  - Identify cultural beliefs/practices regarding lactation, letdown techniques, maternal food preferences. - Assess mother's knowledge and previous experience with breast feeding.  - Provide information as needed about early infant feeding cues (e.g., rooting, lip smacking, sucking fingers/hand) versus late cue of crying.  - Discuss/demonstrate breast feeding aids (e.g., infant sling, nursing footstool/pillows, and breast pumps). - Encourage mother/other family members to express feelings/concerns, and actively listen. - Educate father/SO about benefits of breast feeding and how to manage common lactation challenges. - Recommend avoidance of specific medications or substances incompatible with breast feeding.  - Assess and monitor for signs of nipple pain/trauma. - Instruct and provide assistance with proper latch. - Review techniques for milk expression (breast pumping) and storage of breast milk. Provide pumping equipment/supplies, instructions and assistance, as needed. - Encourage rooming-in and breast feeding on demand.  - Encourage skin-to-skin contact. - Provide LC support as needed. - Assess for and manage engorgement. - Provide breast feeding education handouts and information on community breast feeding support.    Outcome: Progressing  Goal: Establishment of adequate milk supply with medication/procedure interruptions  Description: INTERVENTIONS:  - Review techniques for milk expression (breast pumping). - Provide pumping equipment/supplies, instructions, and assistance until it is safe to breastfeed infant. Outcome: Progressing  Goal: Appropriate maternal -  bonding  Description: INTERVENTIONS:  - Assess caregiver- interactions. - Assess caregiver's emotional status and coping mechanisms. - Encourage caregiver to participate in  daily care. - Assess support systems available to mother/family.  - Provide /case management support as needed.   Outcome: Progressing

## 2022-08-11 ENCOUNTER — TELEPHONE (OUTPATIENT)
Dept: OBGYN UNIT | Facility: HOSPITAL | Age: 42
End: 2022-08-11

## (undated) DEVICE — SUTURE CHROMIC GUT 2-0 CT-1

## (undated) DEVICE — REM POLYHESIVE ADULT PATIENT RETURN ELECTRODE: Brand: VALLEYLAB

## (undated) DEVICE — SPONGE LAP 18X18 XRAY STRL

## (undated) DEVICE — SUTURE MONOCRYL 4-0 Y935H

## (undated) DEVICE — COVER SGL STRL LGHT HNDL BLU

## (undated) DEVICE — C SECTION PACK: Brand: MEDLINE INDUSTRIES, INC.

## (undated) DEVICE — SUTURE PLAIN GUT 2-0 CT

## (undated) DEVICE — TRAY CATH BDX IC 14FR 2L FL

## (undated) DEVICE — KENDALL SCD EXPRESS SLEEVES, KNEE LENGTH, MEDIUM: Brand: KENDALL SCD

## (undated) DEVICE — NON-ADHERENT PAD PREPACK: Brand: TELFA

## (undated) DEVICE — 3M™ STERI-STRIP™ REINFORCED ADHESIVE SKIN CLOSURES, R1547, 1/2 IN X 4 IN (12 MM X 100 MM), 6 STRIPS/ENVELOPE: Brand: 3M™ STERI-STRIP™

## (undated) DEVICE — SUTURE VICRYL 0 CTX

## (undated) DEVICE — ABDOMINAL PAD: Brand: CURITY

## (undated) DEVICE — STERILE POLYISOPRENE POWDER-FREE SURGICAL GLOVES: Brand: PROTEXIS